# Patient Record
Sex: MALE | Race: OTHER | NOT HISPANIC OR LATINO | URBAN - METROPOLITAN AREA
[De-identification: names, ages, dates, MRNs, and addresses within clinical notes are randomized per-mention and may not be internally consistent; named-entity substitution may affect disease eponyms.]

---

## 2021-04-04 ENCOUNTER — EMERGENCY (EMERGENCY)
Facility: HOSPITAL | Age: 26
LOS: 1 days | Discharge: HOME | End: 2021-04-04
Attending: EMERGENCY MEDICINE | Admitting: EMERGENCY MEDICINE
Payer: COMMERCIAL

## 2021-04-04 VITALS
DIASTOLIC BLOOD PRESSURE: 76 MMHG | HEART RATE: 97 BPM | OXYGEN SATURATION: 96 % | HEIGHT: 70 IN | RESPIRATION RATE: 18 BRPM | SYSTOLIC BLOOD PRESSURE: 116 MMHG | WEIGHT: 250 LBS

## 2021-04-04 LAB
ALBUMIN SERPL ELPH-MCNC: 3.9 G/DL — SIGNIFICANT CHANGE UP (ref 3.3–5)
ALP SERPL-CCNC: 136 U/L — HIGH (ref 40–120)
ALT FLD-CCNC: 93 U/L — HIGH (ref 12–78)
ANION GAP SERPL CALC-SCNC: 7 MMOL/L — SIGNIFICANT CHANGE UP (ref 5–17)
AST SERPL-CCNC: 57 U/L — HIGH (ref 15–37)
BASOPHILS # BLD AUTO: 0.01 K/UL — SIGNIFICANT CHANGE UP (ref 0–0.2)
BASOPHILS NFR BLD AUTO: 0.1 % — SIGNIFICANT CHANGE UP (ref 0–2)
BILIRUB SERPL-MCNC: 1 MG/DL — SIGNIFICANT CHANGE UP (ref 0.2–1.2)
BUN SERPL-MCNC: 16 MG/DL — SIGNIFICANT CHANGE UP (ref 7–23)
CALCIUM SERPL-MCNC: 9.2 MG/DL — SIGNIFICANT CHANGE UP (ref 8.5–10.1)
CHLORIDE SERPL-SCNC: 106 MMOL/L — SIGNIFICANT CHANGE UP (ref 96–108)
CK MB CFR SERPL CALC: <1 NG/ML — SIGNIFICANT CHANGE UP (ref 0–3.6)
CO2 SERPL-SCNC: 25 MMOL/L — SIGNIFICANT CHANGE UP (ref 22–31)
CREAT SERPL-MCNC: 0.9 MG/DL — SIGNIFICANT CHANGE UP (ref 0.5–1.3)
D DIMER BLD IA.RAPID-MCNC: <150 NG/ML DDU — SIGNIFICANT CHANGE UP
EOSINOPHIL # BLD AUTO: 0 K/UL — SIGNIFICANT CHANGE UP (ref 0–0.5)
EOSINOPHIL NFR BLD AUTO: 0 % — SIGNIFICANT CHANGE UP (ref 0–6)
GLUCOSE SERPL-MCNC: 141 MG/DL — HIGH (ref 70–99)
HCT VFR BLD CALC: 45.5 % — SIGNIFICANT CHANGE UP (ref 39–50)
HGB BLD-MCNC: 16.2 G/DL — SIGNIFICANT CHANGE UP (ref 13–17)
IMM GRANULOCYTES NFR BLD AUTO: 0.6 % — SIGNIFICANT CHANGE UP (ref 0–1.5)
LYMPHOCYTES # BLD AUTO: 1.01 K/UL — SIGNIFICANT CHANGE UP (ref 1–3.3)
LYMPHOCYTES # BLD AUTO: 10.4 % — LOW (ref 13–44)
MCHC RBC-ENTMCNC: 29.3 PG — SIGNIFICANT CHANGE UP (ref 27–34)
MCHC RBC-ENTMCNC: 35.6 GM/DL — SIGNIFICANT CHANGE UP (ref 32–36)
MCV RBC AUTO: 82.3 FL — SIGNIFICANT CHANGE UP (ref 80–100)
MONOCYTES # BLD AUTO: 0.12 K/UL — SIGNIFICANT CHANGE UP (ref 0–0.9)
MONOCYTES NFR BLD AUTO: 1.2 % — LOW (ref 2–14)
NEUTROPHILS # BLD AUTO: 8.5 K/UL — HIGH (ref 1.8–7.4)
NEUTROPHILS NFR BLD AUTO: 87.7 % — HIGH (ref 43–77)
NRBC # BLD: 0 /100 WBCS — SIGNIFICANT CHANGE UP (ref 0–0)
PLATELET # BLD AUTO: 297 K/UL — SIGNIFICANT CHANGE UP (ref 150–400)
POTASSIUM SERPL-MCNC: 3.7 MMOL/L — SIGNIFICANT CHANGE UP (ref 3.5–5.3)
POTASSIUM SERPL-SCNC: 3.7 MMOL/L — SIGNIFICANT CHANGE UP (ref 3.5–5.3)
PROT SERPL-MCNC: 8.7 G/DL — HIGH (ref 6–8.3)
RBC # BLD: 5.53 M/UL — SIGNIFICANT CHANGE UP (ref 4.2–5.8)
RBC # FLD: 13 % — SIGNIFICANT CHANGE UP (ref 10.3–14.5)
SARS-COV-2 RNA SPEC QL NAA+PROBE: SIGNIFICANT CHANGE UP
SODIUM SERPL-SCNC: 138 MMOL/L — SIGNIFICANT CHANGE UP (ref 135–145)
TROPONIN I SERPL-MCNC: <.015 NG/ML — SIGNIFICANT CHANGE UP (ref 0.01–0.04)
WBC # BLD: 9.7 K/UL — SIGNIFICANT CHANGE UP (ref 3.8–10.5)
WBC # FLD AUTO: 9.7 K/UL — SIGNIFICANT CHANGE UP (ref 3.8–10.5)

## 2021-04-04 PROCEDURE — 71046 X-RAY EXAM CHEST 2 VIEWS: CPT | Mod: 26

## 2021-04-04 PROCEDURE — 84484 ASSAY OF TROPONIN QUANT: CPT

## 2021-04-04 PROCEDURE — U0003: CPT

## 2021-04-04 PROCEDURE — 80053 COMPREHEN METABOLIC PANEL: CPT

## 2021-04-04 PROCEDURE — 99284 EMERGENCY DEPT VISIT MOD MDM: CPT | Mod: 25

## 2021-04-04 PROCEDURE — 99285 EMERGENCY DEPT VISIT HI MDM: CPT

## 2021-04-04 PROCEDURE — 93010 ELECTROCARDIOGRAM REPORT: CPT

## 2021-04-04 PROCEDURE — 82553 CREATINE MB FRACTION: CPT

## 2021-04-04 PROCEDURE — 36415 COLL VENOUS BLD VENIPUNCTURE: CPT

## 2021-04-04 PROCEDURE — U0005: CPT

## 2021-04-04 PROCEDURE — 85025 COMPLETE CBC W/AUTO DIFF WBC: CPT

## 2021-04-04 PROCEDURE — 93005 ELECTROCARDIOGRAM TRACING: CPT

## 2021-04-04 PROCEDURE — 85379 FIBRIN DEGRADATION QUANT: CPT

## 2021-04-04 PROCEDURE — 96374 THER/PROPH/DIAG INJ IV PUSH: CPT

## 2021-04-04 PROCEDURE — 71046 X-RAY EXAM CHEST 2 VIEWS: CPT

## 2021-04-04 RX ORDER — SODIUM CHLORIDE 9 MG/ML
1000 INJECTION INTRAMUSCULAR; INTRAVENOUS; SUBCUTANEOUS ONCE
Refills: 0 | Status: COMPLETED | OUTPATIENT
Start: 2021-04-04 | End: 2021-04-04

## 2021-04-04 RX ADMIN — SODIUM CHLORIDE 1000 MILLILITER(S): 9 INJECTION INTRAMUSCULAR; INTRAVENOUS; SUBCUTANEOUS at 17:32

## 2021-04-04 RX ADMIN — Medication 125 MILLIGRAM(S): at 17:32

## 2021-04-04 NOTE — ED ADULT TRIAGE NOTE - CHIEF COMPLAINT QUOTE
dry cough/pleuritic cp dry cough/exertional chest pressure dry cough/exertional chest pressure,[facility] and give prednisone and breathing treatment

## 2021-04-04 NOTE — ED PROVIDER NOTE - CARE PROVIDER_API CALL
CARINE NORMAN  Internal Medicine  [address] [place] [address]  Phone: [phone]  Fax: [phone]  Follow Up Time:

## 2021-04-04 NOTE — ED PROVIDER NOTE - PATIENT PORTAL LINK FT
You can access the FollowMyHealth Patient Portal offered by [facility] by registering at the following website: [email or web address]. By joining [facility] FollowMyHealth portal, you will also be able to view your health information using other applications (apps) compatible with our system.

## 2021-04-04 NOTE — ED ADULT NURSE NOTE - OBJECTIVE STATEMENT
Pt p/w chest pressure, cough x 4 days. Given prednisone 2 days ago for asthma exacerbation w/o relief of symptoms. Reports he feels the pressure higher than his normal asthma exacerbation. Was referred to ED by PMD.

## 2021-04-04 NOTE — ED PROVIDER NOTE - OBJECTIVE STATEMENT
25 male presents to ER with mother c/o chest pressure, nonproductive cough, shortness of breath for 4-5 days, states he has been taking his nebulizer, went to urgent care on Friday, rapid covid was negative, started on prednisone 40mg orally once daily, today still feeling same symptoms.

## 2021-04-04 NOTE — ED PROVIDER NOTE - IV ALTEPLASE DOOR HIDDEN
Is This A New Presentation, Or A Follow-Up?: Skin Lesions How Severe Is Your Skin Lesion?: mild show

## 2021-04-04 NOTE — ED ADULT NURSE NOTE - PRO INTERPRETER NEED 2
Dispense Refill    escitalopram (LEXAPRO) 20 MG tablet 1 po q day 90 tablet 0    ciclopirox (PENLAC) 8 % solution Apply topically nightly. 6 mL 1    cetirizine (ZYRTEC) 10 MG tablet TAKE 1 TABLET BY MOUTH EVERY DAY FOR ALLERGIES. 90 tablet 0    fluticasone (FLONASE) 50 MCG/ACT nasal spray 1 spray by Nasal route daily      Prenatal Vit-Fe Fumarate-FA (PRENATAL PO) Take by mouth      ALPRAZolam (XANAX) 0.25 MG tablet Take 0.25 mg by mouth nightly as needed for Sleep. .               Past Medical History:   Diagnosis Date    Anxiety     BPPV (benign paroxysmal positional vertigo) 4/30/2018    GERD (gastroesophageal reflux disease)     Gestational diabetes        Past Surgical History:   Procedure Laterality Date    WISDOM TOOTH EXTRACTION           Social History     Social History    Marital status:      Spouse name: N/A    Number of children: N/A    Years of education: N/A     Occupational History    Not on file. Social History Main Topics    Smoking status: Never Smoker    Smokeless tobacco: Never Used    Alcohol use No    Drug use: Unknown    Sexual activity: Yes     Other Topics Concern    Not on file     Social History Narrative    No narrative on file       No family history on file.         Review of Systems    Objective     BP 90/62   Pulse 69   Resp 12   Ht 5' 5\" (1.651 m)   Wt 121 lb (54.9 kg)   SpO2 97% Comment: RA  BMI 20.14 kg/m²     @LASTSAO2(3)@    Wt Readings from Last 3 Encounters:   10/18/18 121 lb (54.9 kg)   09/20/18 118 lb (53.5 kg)   06/21/18 118 lb (53.5 kg)       Physical Exam     NAD alert and cooperative        Chemistry        Component Value Date/Time     05/01/2018 1109    K 4.1 05/01/2018 1109     05/01/2018 1109    CO2 25 05/01/2018 1109    BUN 12 05/01/2018 1109    CREATININE 0.6 05/01/2018 1109        Component Value Date/Time    CALCIUM 9.2 05/01/2018 1109    ALKPHOS 29 (L) 05/01/2018 1109    AST 10 (L) 05/01/2018 1109    ALT 7 (L) English

## 2021-04-04 NOTE — ED PROVIDER NOTE - NSFOLLOWUPINSTRUCTIONS_ED_ALL_ED_FT
Follow up with your primary care doctor in 2 days.  Continue taking prednisone as directed.  Drink plenty of fluids and rest.  Use your nebulizer and or inhaler as needed.

## 2021-04-04 NOTE — ED ADULT NURSE NOTE - CHIEF COMPLAINT QUOTE
dry cough/pleuritic cp dry cough/pleuritic cp, seen at the [facility] center 2 days ago and given prednisson and albuterol puff

## 2021-04-04 NOTE — ED ADULT NURSE NOTE - PMH
H/O intestinal obstruction  H/O intestinal obstruction S/P resection and anastomosis at [facility] [place] in 1997  H/O seasonal allergies    History of asthma  H/O asthma. No hospitalizations. Last treated for asthma flare up with oral steroids and bronchodilators one month ago. Plan to start twice dialy bronchodilators 7 days prior to surgery and oral steroids 2 days prior to surgery

## 2021-04-04 NOTE — ED PROVIDER NOTE - PMH
H/O intestinal obstruction  H/O intestinal obstruction S/P resection and anastomosis at [facility] in 1997  H/O seasonal allergies    History of asthma  H/O asthma. No hospitalizations. Last treated for asthma flare up with oral steroids and bronchodilators one month ago. Plan to start twice dialy bronchodilators 7 days prior to surgery and oral steroids 2 days prior to surgery

## 2021-04-04 NOTE — ED PROVIDER NOTE - PROGRESS NOTE DETAILS
patient feeling better, no acute distress, ekg, labs, chest xray reviwed, agrees to f/u with PMD this

## 2021-04-04 NOTE — ED PROVIDER NOTE - HIV OFFER
Problem: At Risk for Falls  Goal: # Patient does not fall  Outcome: Outcome Met, Continue evaluating goal progress toward completion     Problem: At Risk for Injury Due to Fall  Goal: # Patient does not fall  Outcome: Outcome Met, Continue evaluating goal progress toward completion      Opt out

## 2021-04-08 ENCOUNTER — TRANSCRIPTION ENCOUNTER (OUTPATIENT)
Age: 26
End: 2021-04-08

## 2021-04-10 ENCOUNTER — TRANSCRIPTION ENCOUNTER (OUTPATIENT)
Age: 26
End: 2021-04-10

## 2021-04-24 ENCOUNTER — TRANSCRIPTION ENCOUNTER (OUTPATIENT)
Age: 26
End: 2021-04-24

## 2021-04-28 ENCOUNTER — TRANSCRIPTION ENCOUNTER (OUTPATIENT)
Age: 26
End: 2021-04-28

## 2021-12-08 ENCOUNTER — TRANSCRIPTION ENCOUNTER (OUTPATIENT)
Age: 26
End: 2021-12-08

## 2022-05-06 ENCOUNTER — NON-APPOINTMENT (OUTPATIENT)
Age: 27
End: 2022-05-06

## 2022-06-03 ENCOUNTER — APPOINTMENT (OUTPATIENT)
Dept: ORTHOPEDIC SURGERY | Facility: CLINIC | Age: 27
End: 2022-06-03
Payer: COMMERCIAL

## 2022-06-03 VITALS — HEIGHT: 70 IN | WEIGHT: 250 LBS | BODY MASS INDEX: 35.79 KG/M2

## 2022-06-03 DIAGNOSIS — Z78.9 OTHER SPECIFIED HEALTH STATUS: ICD-10-CM

## 2022-06-03 DIAGNOSIS — M75.21 BICIPITAL TENDINITIS, RIGHT SHOULDER: ICD-10-CM

## 2022-06-03 PROCEDURE — 73030 X-RAY EXAM OF SHOULDER: CPT | Mod: RT

## 2022-06-03 PROCEDURE — 99214 OFFICE O/P EST MOD 30 MIN: CPT

## 2022-06-03 PROCEDURE — 73010 X-RAY EXAM OF SHOULDER BLADE: CPT | Mod: RT

## 2022-06-03 RX ORDER — CETIRIZINE HCL 10 MG
TABLET ORAL
Refills: 0 | Status: ACTIVE | COMMUNITY

## 2022-06-03 RX ORDER — ALBUTEROL SULFATE 90 UG/1
108 (90 BASE) AEROSOL, METERED RESPIRATORY (INHALATION)
Refills: 0 | Status: ACTIVE | COMMUNITY

## 2022-06-03 RX ORDER — DICLOFENAC SODIUM 75 MG/1
75 TABLET, DELAYED RELEASE ORAL
Qty: 20 | Refills: 0 | Status: ACTIVE | COMMUNITY
Start: 2022-06-03 | End: 1900-01-01

## 2022-06-03 RX ORDER — MOMETASONE FUROATE AND FORMOTEROL FUMARATE DIHYDRATE 50; 5 UG/1; UG/1
AEROSOL RESPIRATORY (INHALATION)
Refills: 0 | Status: ACTIVE | COMMUNITY

## 2022-06-03 RX ORDER — FLUTICASONE PROPIONATE 50 MCG
50 SPRAY, SUSPENSION NASAL
Refills: 0 | Status: ACTIVE | COMMUNITY

## 2022-06-03 NOTE — HISTORY OF PRESENT ILLNESS
[Sudden] : sudden [4] : 4 [0] : 0 [Dull/Aching] : dull/aching [Occasional] : occasional [Extending back] : extending back [de-identified] : This is Mr. ANJELICA TIWARI  a 26 year male who comes in today complaining of right shoulder after serving a volleyball about a month ago.  ntoes he had some improvement, but has persistent pain while serving and spiking. pain lateral and anteriror. competitive  [] : no [FreeTextEntry7] : to bicep [FreeTextEntry9] : icy/hot/hot water [de-identified] : raising arm

## 2022-06-03 NOTE — IMAGING
[Right] : right shoulder [There are no fractures, subluxations or dislocations. No significant abnormalities are seen] : There are no fractures, subluxations or dislocations. No significant abnormalities are seen [de-identified] : Right shoulder exam: \par \par General: no distress. ligamentous laxity\par Skin: no significant pertinent finding\par Inspection: no obvious deformity, no obvious masses, no swelling, no effusion, no atrophy\par ROM: \par    FF: 180\par    Abduction: 180\par    ER: 90\par    IR: T12\par Tenderness:\par    Anterior: (-)\par    Posterior: (-)\par    Lateral: (-)\par    Trapezius: (-)\par    Scapula: (-)\par    AC joint: (-)\par    Crepitus with ROM: (-)\par Stability: \par    Anterior translation: (-)\par    Posterior translation: (-)\par    Apprehension: +\par    Clicking: (-)\par Additional tests: \par    Neer's: (-)\par    Hawkin's: (-)\par    Denny's: (-)\par    Speed: (-)\par    Cross chest adduction: (-)\par Strength: \par    FF: 5/5\par    ER: 5/5\par    IR: 5/5\par    Biceps: 5/5\par    Triceps: 5/5\par    Distal: 5/5\par Neuro: In tact to light touch throughout\par Vascularity: Extremity warm and well perfused\par \par \par

## 2022-06-04 ENCOUNTER — APPOINTMENT (OUTPATIENT)
Dept: ORTHOPEDIC SURGERY | Facility: CLINIC | Age: 27
End: 2022-06-04
Payer: COMMERCIAL

## 2022-06-04 VITALS — WEIGHT: 250 LBS | BODY MASS INDEX: 35.79 KG/M2 | HEIGHT: 70 IN

## 2022-06-04 DIAGNOSIS — Z78.9 OTHER SPECIFIED HEALTH STATUS: ICD-10-CM

## 2022-06-04 DIAGNOSIS — Z00.00 ENCOUNTER FOR GENERAL ADULT MEDICAL EXAMINATION W/OUT ABNORMAL FINDINGS: ICD-10-CM

## 2022-06-04 DIAGNOSIS — J45.909 UNSPECIFIED ASTHMA, UNCOMPLICATED: ICD-10-CM

## 2022-06-04 DIAGNOSIS — S86.812A STRAIN OF OTHER MUSCLE(S) AND TENDON(S) AT LOWER LEG LEVEL, LEFT LEG, INITIAL ENCOUNTER: ICD-10-CM

## 2022-06-04 PROCEDURE — L4361: CPT

## 2022-06-04 PROCEDURE — 73590 X-RAY EXAM OF LOWER LEG: CPT | Mod: LT

## 2022-06-04 PROCEDURE — 99213 OFFICE O/P EST LOW 20 MIN: CPT

## 2022-06-04 NOTE — PHYSICAL EXAM
[Left] : left foot and ankle [5___] : plantar flexion 5[unfilled]/5 [] : negative Estrella test [FreeTextEntry8] : mid substance calf tenderness to palpation [FreeTextEntry9] : full ROM with pain

## 2022-06-04 NOTE — HISTORY OF PRESENT ILLNESS
[6] : 6 [0] : 0 [Dull/Aching] : dull/aching [Sharp] : sharp [Localized] : localized [Intermittent] : intermittent [Standing] : standing [Walking] : walking [Stairs] : stairs [Full time] : Work status: full time [de-identified] : 26M Here with left calf pain. Felt a pull in his calf playing volleyball today 6/4. Walking with a limp. [] : Post Surgical Visit: no [FreeTextEntry1] : left leg [FreeTextEntry3] : 6/4/22 [FreeTextEntry5] : patient was playing volleyball earlier today on 6/4/22, went for a serve and then felt a pop in his calf

## 2022-06-04 NOTE — ASSESSMENT
[FreeTextEntry1] : Condition exaplained-discussed timeline for healing\par CAM boot \par PT\par NSAIDs\par fu 3 weeks

## 2022-06-30 ENCOUNTER — APPOINTMENT (OUTPATIENT)
Dept: ORTHOPEDIC SURGERY | Facility: CLINIC | Age: 27
End: 2022-06-30

## 2022-12-06 ENCOUNTER — FORM ENCOUNTER (OUTPATIENT)
Age: 27
End: 2022-12-06

## 2022-12-07 ENCOUNTER — APPOINTMENT (OUTPATIENT)
Dept: ORTHOPEDIC SURGERY | Facility: CLINIC | Age: 27
End: 2022-12-07

## 2022-12-07 ENCOUNTER — APPOINTMENT (OUTPATIENT)
Dept: MRI IMAGING | Facility: CLINIC | Age: 27
End: 2022-12-07

## 2022-12-07 VITALS — WEIGHT: 260 LBS | BODY MASS INDEX: 37.22 KG/M2 | HEIGHT: 70 IN

## 2022-12-07 DIAGNOSIS — M25.311 OTHER INSTABILITY, RIGHT SHOULDER: ICD-10-CM

## 2022-12-07 PROCEDURE — 73221 MRI JOINT UPR EXTREM W/O DYE: CPT | Mod: RT

## 2022-12-07 PROCEDURE — 99213 OFFICE O/P EST LOW 20 MIN: CPT

## 2022-12-07 NOTE — HISTORY OF PRESENT ILLNESS
[6] : 6 [5] : 5 [Dull/Aching] : dull/aching [Ice] : ice [de-identified] : 12/07/2022:  Mr. TIWARI IS A 27 year YEAR OLD male PRESENTS TODAY FOR RIGHT SHOULDER. PAIN STARTED MAY 2022 WHILE PLAYING VOLLEYBALL, HE PLAYS COMPETITIVELY. SYMPTOMS WORSE WITH OVERHAND SERVE. ENDED UP RESTING THE SHOULDER FOR MANY MONTHS DUE TO LEG INJURY AND JUST STARTED PLAYING AGAIN. NOTICED SYMPTOMS QUICKLY RETURNED.  [] : no [FreeTextEntry1] : r shoulder [FreeTextEntry3] : May 2022 [FreeTextEntry5] : has pain from playing vol[employer]ball  [de-identified] : activity [de-identified] : 2022 [de-identified] : Dr. Bustillos [de-identified] : xr

## 2022-12-07 NOTE — ASSESSMENT
[FreeTextEntry1] : TRAUMATIC RIGHT SHOULDER PAIN AFTER SERVING IN VOLLEYBALL\par PRIOR XRAYS NEGATIVE\par MRI TO EVAL, R/O LABRAL TEAR\par RTO P MRI

## 2022-12-07 NOTE — IMAGING
[de-identified] : RIGHT SHOULDER\par No swelling, no ecchymosis, no deformity, no scapular winging.\par No tenderness to palpation over shoulder, AC joint or trapezius.\par Forward flexion to 180; external rotation to 90 degrees (with shoulder abducted); internal rotation to 70 degrees (with shoulder abducted) WITH PAIN\par 5/5 supraspinatus, infraspinatus and subscapularis WITH PAIN\par Negative Ring test, negative impingement sign, POSITIVE O’Benjie test.\par Speed’s and yergason negative\par Motor and sensory intact distally\par

## 2022-12-12 ENCOUNTER — APPOINTMENT (OUTPATIENT)
Dept: ORTHOPEDIC SURGERY | Facility: CLINIC | Age: 27
End: 2022-12-12

## 2022-12-14 ENCOUNTER — APPOINTMENT (OUTPATIENT)
Dept: ORTHOPEDIC SURGERY | Facility: CLINIC | Age: 27
End: 2022-12-14

## 2022-12-14 VITALS — BODY MASS INDEX: 37.22 KG/M2 | HEIGHT: 70 IN | WEIGHT: 260 LBS

## 2022-12-14 DIAGNOSIS — S43.431D SUPERIOR GLENOID LABRUM LESION OF RIGHT SHOULDER, SUBSEQUENT ENCOUNTER: ICD-10-CM

## 2022-12-14 PROCEDURE — 99214 OFFICE O/P EST MOD 30 MIN: CPT

## 2022-12-14 NOTE — IMAGING
[de-identified] : No swelling, no ecchymosis, no shelby deformity\par Tenderness to palpation over anterior shoulder\par No instability or tenderness over AC joint\par Full range of motion with pain\par 5/5 supraspinatus, infraspinatus and subscapularis; there is pain with strength testing\par Positive O’Benjie\par No anterior or posterior shift, no sulcus sign\par Negative apprehension\par Motor and sensory intact distally\par

## 2022-12-14 NOTE — ASSESSMENT
[FreeTextEntry1] : REVIEWED MRI, SLAP FROM REPETITIVE OVERHEAD SERVING IN VOLLEYBALL\par COURSE OF PT AND ACTIVITY MODIFICATION ADVISED FOR THE NEXT 3 MONTHS\par DISCUSSED SURGICAL REPAIR IF PERSISTS

## 2023-04-26 ENCOUNTER — NON-APPOINTMENT (OUTPATIENT)
Age: 28
End: 2023-04-26

## 2024-09-19 NOTE — ED PROVIDER NOTE - AGGRAVATING FACTORS
breathing deeply Partially impaired: cannot see medication labels or newsprint, but can see obstacles in path, and the surrounding layout; can count fingers at arm's length

## 2024-10-10 ENCOUNTER — APPOINTMENT (OUTPATIENT)
Dept: ORTHOPEDIC SURGERY | Facility: CLINIC | Age: 29
End: 2024-10-10
Payer: COMMERCIAL

## 2024-10-10 ENCOUNTER — RESULT CHARGE (OUTPATIENT)
Age: 29
End: 2024-10-10

## 2024-10-10 VITALS — WEIGHT: 260 LBS | BODY MASS INDEX: 37.22 KG/M2 | HEIGHT: 70 IN

## 2024-10-10 DIAGNOSIS — M72.2 PLANTAR FASCIAL FIBROMATOSIS: ICD-10-CM

## 2024-10-10 PROCEDURE — 73630 X-RAY EXAM OF FOOT: CPT | Mod: LT

## 2024-10-10 PROCEDURE — 99214 OFFICE O/P EST MOD 30 MIN: CPT

## 2024-10-10 PROCEDURE — 73600 X-RAY EXAM OF ANKLE: CPT | Mod: LT
